# Patient Record
Sex: MALE | Race: BLACK OR AFRICAN AMERICAN | URBAN - METROPOLITAN AREA
[De-identification: names, ages, dates, MRNs, and addresses within clinical notes are randomized per-mention and may not be internally consistent; named-entity substitution may affect disease eponyms.]

---

## 2022-10-21 ENCOUNTER — OFFICE VISIT (OUTPATIENT)
Dept: URBAN - METROPOLITAN AREA CLINIC 56 | Facility: CLINIC | Age: 82
End: 2022-10-21
Payer: MEDICARE

## 2022-10-21 DIAGNOSIS — H11.062 RECURRENT PTERYGIUM OF LEFT EYE: ICD-10-CM

## 2022-10-21 DIAGNOSIS — H25.813 COMBINED FORMS OF AGE-RELATED CATARACT, BILATERAL: ICD-10-CM

## 2022-10-21 DIAGNOSIS — H40.1134 PRIMARY OPEN-ANGLE GLAUCOMA, INDETERMINATE, BILATERAL: ICD-10-CM

## 2022-10-21 DIAGNOSIS — H40.023 OPEN ANGLE WITH BORDERLINE FINDINGS, HIGH RISK, BILATERAL: Primary | ICD-10-CM

## 2022-10-21 PROCEDURE — 99204 OFFICE O/P NEW MOD 45 MIN: CPT | Performed by: OPTOMETRIST

## 2022-10-21 PROCEDURE — 92134 CPTRZ OPH DX IMG PST SGM RTA: CPT | Performed by: OPTOMETRIST

## 2022-10-21 PROCEDURE — 92133 CPTRZD OPH DX IMG PST SGM ON: CPT | Performed by: OPTOMETRIST

## 2022-10-21 ASSESSMENT — KERATOMETRY
OS: 41.72
OD: 42.48

## 2022-10-21 ASSESSMENT — VISUAL ACUITY
OS: 20/60
OD: 20/70

## 2022-10-21 ASSESSMENT — INTRAOCULAR PRESSURE
OS: 13
OD: 13

## 2022-10-21 NOTE — IMPRESSION/PLAN
Impression: Primary open-angle glaucoma, indeterminate, bilateral: H40.6671. Plan: Significant loss of RNFL/GCA OU. Discussed glaucoma with patient. Will get another IOP reading prior to starting treatment. Can do glaucoma procedure with cataract surgery. RTC for VF 24-2, PACHS and IOP check 3-4 weeks. Patient education importance of returning for appointments.

## 2022-10-21 NOTE — IMPRESSION/PLAN
Impression: Recurrent pterygium of left eye: H11.062. Plan: Not bothersome to patient. Recommend ATs and UV protection while outdoors.

## 2022-10-21 NOTE — IMPRESSION/PLAN
Impression: Combined forms of age-related cataract, bilateral: H25.813. Plan: Discussed cataract diagnosis with the patient. Discussed and reviewed treatment options for cataracts. Surgical treatment is required for cataracts. Risks and benefits of surgical treatment were discussed and understood. Recommend surgery OU, OD first. Patient is candidate/interested in multifocal, toric, standard, LenSx and ORA. Aim OD: plano. Aim OS: plano. Patient will need glasses for all near work, including computer. Outcome of surgery limitations include:  Recurrent pterygium of left eye, Open angle with borderline findings, high risk, bilateral, *Glaucoma surgeon recommended. *

## 2022-12-12 ENCOUNTER — OFFICE VISIT (OUTPATIENT)
Dept: URBAN - METROPOLITAN AREA CLINIC 56 | Facility: LOCATION | Age: 82
End: 2022-12-12
Payer: MEDICARE

## 2022-12-12 DIAGNOSIS — H40.1131 PRIMARY OPEN-ANGLE GLAUCOMA, MILD STAGE, BILATERAL: Primary | ICD-10-CM

## 2022-12-12 DIAGNOSIS — H25.813 COMBINED FORMS OF AGE-RELATED CATARACT, BILATERAL: ICD-10-CM

## 2022-12-12 PROCEDURE — 99214 OFFICE O/P EST MOD 30 MIN: CPT | Performed by: OPTOMETRIST

## 2022-12-12 PROCEDURE — 92083 EXTENDED VISUAL FIELD XM: CPT | Performed by: OPTOMETRIST

## 2022-12-12 PROCEDURE — 76514 ECHO EXAM OF EYE THICKNESS: CPT | Performed by: OPTOMETRIST

## 2022-12-12 RX ORDER — LATANOPROST 50 UG/ML
0.005 % SOLUTION OPHTHALMIC
Qty: 7.5 | Refills: 3 | Status: ACTIVE
Start: 2022-12-12

## 2022-12-12 ASSESSMENT — INTRAOCULAR PRESSURE
OD: 12
OS: 12

## 2022-12-12 NOTE — IMPRESSION/PLAN
Impression: Combined forms of age-related cataract, bilateral: H25.813. Plan: Patient wishes to hold off on cataract surgery for now. Discussed with patient that vision is limited and surgery will improve vision.

## 2022-12-12 NOTE — IMPRESSION/PLAN
Impression: Primary open-angle glaucoma, mild stage, bilateral: H40.1131. Plan: IOP 12/12 [address] OCT RNFL significant loss OU VF OD: full OS: superior arcuate defect. Discussed with patient due to findings on Visual Field, OCT, and posterior examination, treatment is recommended for glaucoma. IOP is too high for the level of glaucomatous damage at the present time. Recommend lowering IOP. Emphasized and explained compliance. Poor compliance can lead to blindness. Call with any changes in vision, sudden onset of pain or new symptoms. 
Start Latanoprost QHS OU

## 2023-01-09 ENCOUNTER — OFFICE VISIT (OUTPATIENT)
Dept: URBAN - METROPOLITAN AREA CLINIC 56 | Facility: LOCATION | Age: 83
End: 2023-01-09
Payer: MEDICARE

## 2023-01-09 DIAGNOSIS — H25.813 COMBINED FORMS OF AGE-RELATED CATARACT, BILATERAL: ICD-10-CM

## 2023-01-09 DIAGNOSIS — H40.1131 PRIMARY OPEN-ANGLE GLAUCOMA, MILD STAGE, BILATERAL: Primary | ICD-10-CM

## 2023-01-09 PROCEDURE — 99213 OFFICE O/P EST LOW 20 MIN: CPT | Performed by: OPTOMETRIST

## 2023-01-09 ASSESSMENT — INTRAOCULAR PRESSURE
OD: 10
OD: 12
OS: 10
OS: 11

## 2023-01-09 NOTE — IMPRESSION/PLAN
Impression: Primary open-angle glaucoma, mild stage, bilateral: H40.1131. Plan: IOP  improved OU. 
Continue Latanoprost QHS OU

## 2023-01-09 NOTE — IMPRESSION/PLAN
Impression: Combined forms of age-related cataract, bilateral: H25.813.
-patient's wife present today. patient is interested in cataract surgery. Fabi's wife will be traveliing to [place] March to May - will need to work around her schedule. Will need repeat cat eval. Plan: Discussed cataract diagnosis with the patient. Discussed and reviewed treatment options for cataracts. Risks and benefits of surgical treatment were discussed and understood. Recommend surgery OU, OD first. Patient is candidate/interested in multifocal, toric, standard, LenSx and ORA. Aim OD: plano. Aim OS: plano. Glaucoma surgeon recommended. Consider MIGS. Patient will need glasses for all near work, including computer.  Outcome of surgery limitations include:  Primary open-angle glaucoma, mild stage, bilateral,

## 2023-01-20 ENCOUNTER — OFFICE VISIT (OUTPATIENT)
Dept: URBAN - METROPOLITAN AREA CLINIC 56 | Facility: LOCATION | Age: 83
End: 2023-01-20
Payer: MEDICARE

## 2023-01-20 DIAGNOSIS — H25.813 COMBINED FORMS OF AGE-RELATED CATARACT, BILATERAL: Primary | ICD-10-CM

## 2023-01-20 DIAGNOSIS — H40.1131 PRIMARY OPEN-ANGLE GLAUCOMA, MILD STAGE, BILATERAL: ICD-10-CM

## 2023-01-20 DIAGNOSIS — H11.062 RECURRENT PTERYGIUM OF LEFT EYE: ICD-10-CM

## 2023-01-20 PROCEDURE — 99214 OFFICE O/P EST MOD 30 MIN: CPT | Performed by: OPTOMETRIST

## 2023-01-20 PROCEDURE — 92134 CPTRZ OPH DX IMG PST SGM RTA: CPT | Performed by: OPTOMETRIST

## 2023-01-20 ASSESSMENT — VISUAL ACUITY
OS: 20/70
OD: 20/80

## 2023-01-20 ASSESSMENT — INTRAOCULAR PRESSURE
OD: 11
OS: 10

## 2023-01-20 ASSESSMENT — KERATOMETRY
OD: 42.45
OS: 41.66

## 2023-01-20 NOTE — IMPRESSION/PLAN
Impression: Combined forms of age-related cataract, bilateral: H25.813.
-patient's wife present today. patient is interested in cataract surgery. Fabi's wife will be traveliing to [place] March to May - will need to work around her schedule. Will need repeat cat eval. Plan: Discussed cataract diagnosis with the patient. Discussed and reviewed treatment options for cataracts. Risks and benefits of surgical treatment were discussed and understood. Recommend surgery OU, OD first. Patient is candidate/interested in , standard. **Not a candidate for multifocal** Aim OD: plano. Aim OS: plano. *Glaucoma surgeon recommended. Consider MIGS. *
Patient will need glasses for all near work, including computer.  Outcome of surgery limitations include:  Primary open-angle glaucoma, mild stage, bilateral

## 2023-03-03 ENCOUNTER — TESTING ONLY (OUTPATIENT)
Dept: URBAN - METROPOLITAN AREA CLINIC 56 | Facility: LOCATION | Age: 83
End: 2023-03-03
Payer: MEDICARE

## 2023-03-03 DIAGNOSIS — H25.813 COMBINED FORMS OF AGE-RELATED CATARACT, BILATERAL: Primary | ICD-10-CM

## 2023-03-03 DIAGNOSIS — Z01.818 ENCOUNTER FOR OTHER PREPROCEDURAL EXAMINATION: Primary | ICD-10-CM

## 2023-03-03 PROCEDURE — 99203 OFFICE O/P NEW LOW 30 MIN: CPT | Performed by: PHYSICIAN ASSISTANT

## 2023-03-06 ENCOUNTER — PRE-OPERATIVE VISIT (OUTPATIENT)
Dept: URBAN - METROPOLITAN AREA CLINIC 44 | Facility: CLINIC | Age: 83
End: 2023-03-06
Payer: MEDICARE

## 2023-03-06 DIAGNOSIS — H40.1131 BILATERAL PRIMARY OPEN-ANGLE GLAUCOMA, MILD STAGE: Primary | ICD-10-CM

## 2023-03-06 DIAGNOSIS — H25.13 AGE-RELATED NUCLEAR CATARACT, BILATERAL: ICD-10-CM

## 2023-03-06 PROCEDURE — 99204 OFFICE O/P NEW MOD 45 MIN: CPT | Performed by: OPHTHALMOLOGY

## 2023-03-06 ASSESSMENT — INTRAOCULAR PRESSURE
OD: 12
OS: 12

## 2023-03-06 NOTE — IMPRESSION/PLAN
Impression: Bilateral primary open-angle glaucoma, mild stage: H40.1131. Plan: Pt has Glaucoma    Gonio :        Pachs:  474/467    Today's IOP :  12/12 Unknown Target IOP high singles low teens Pt denies Fhx of Glaucoma Left eye is the better seeing eye HVF OD Full OS superior nasal step 1/2023 C/D:  0.6x0.68/0.8x0.85 OCT:57/56 Pt denies Sulfa Allergy   // Pt denies Lung /Heart dx Plan :
1. Continue Latanoprost QHS OU Discussed details about Glaucoma and that without proper control of pressures irreversible blindness can occur. Patient understands risks. Emphasize compliance with drop and without compliance vision loss progression can occur. 
2. Recommend cataract with Multi Migs, very thin cornea IOP goal high singles very low teens

## 2023-03-06 NOTE — IMPRESSION/PLAN
Impression: Age-related nuclear cataract, bilateral: H25.13. Plan: Standard OD // Standard OS )( AIM-0.25 OU:  Trimoxi 1st choice, Block, MIGS (OMNI,HYDRUS))) - Min 15 **First case 25/2 medical transport Discussed cataract diagnosis with the patient. Appropriate testing ordered for cataract diagnosis prior to Preop. Risks and benefits of surgical treatment were discussed and understood. Patient desires surgical treatment. Discussed lens options with pt and pt is ok with wearing glasses after surgery. Patient desires to proceed with surgery. Both eyes examined, medically necessary due to impact in activities of daily living. Discussed with pt limitations of MIGS device and it does not replace  Glaucoma eye drops and would have to continue treatment and would still need glasses after surgery. Discussed higher risks with smaller pupil and discussed iris stretch and higher risks of bleeding.  Discussed there is a chance of developing capsular haze after surgery, which may be corrected with laser/yag

## 2023-03-20 ENCOUNTER — SURGERY (OUTPATIENT)
Dept: URBAN - METROPOLITAN AREA SURGERY 19 | Facility: SURGERY | Age: 83
End: 2023-03-20
Payer: MEDICARE

## 2023-03-20 DIAGNOSIS — H40.1131 PRIMARY OPEN-ANGLE GLAUCOMA, BILATERAL, MILD STAGE: ICD-10-CM

## 2023-03-20 DIAGNOSIS — H25.13 AGE-RELATED NUCLEAR CATARACT, BILATERAL: Primary | ICD-10-CM

## 2023-03-20 PROCEDURE — 66991 XCAPSL CTRC RMVL INSJ 1+: CPT | Performed by: OPHTHALMOLOGY

## 2023-03-21 ENCOUNTER — POST-OPERATIVE VISIT (OUTPATIENT)
Dept: URBAN - METROPOLITAN AREA CLINIC 56 | Facility: LOCATION | Age: 83
End: 2023-03-21

## 2023-03-21 DIAGNOSIS — Z48.810 ENCOUNTER FOR SURGICAL AFTERCARE FOLLOWING SURGERY ON A SENSE ORGAN: Primary | ICD-10-CM

## 2023-03-21 PROCEDURE — 99024 POSTOP FOLLOW-UP VISIT: CPT | Performed by: STUDENT IN AN ORGANIZED HEALTH CARE EDUCATION/TRAINING PROGRAM

## 2023-03-21 ASSESSMENT — INTRAOCULAR PRESSURE
OD: 10
OD: 12

## 2023-03-21 NOTE — IMPRESSION/PLAN
Impression: S/P Cataract Extraction by phacoemulsification with IOL placement; OMNI; Hydrus Microstent OD - 1 Day. Encounter for surgical aftercare following surgery on a sense organ  Z48.810. Excellent post op course   Post operative instructions reviewed - Plan: good IOP. Restrictions discussed. Call with worsening pain or vision. OK to stay of latanoprost OD, cont OS until sx RTC as scheduled for 1 WK PO

## 2023-03-27 DIAGNOSIS — Z48.810 ENCOUNTER FOR SURGICAL AFTERCARE FOLLOWING SURGERY ON A SENSE ORGAN: Primary | ICD-10-CM

## 2023-03-27 PROCEDURE — 99024 POSTOP FOLLOW-UP VISIT: CPT | Performed by: OPTOMETRIST

## 2023-03-27 RX ORDER — PREDNISOLONE ACETATE 10 MG/ML
1 % SUSPENSION/ DROPS OPHTHALMIC
Qty: 7.5 | Refills: 1 | Status: ACTIVE
Start: 2023-03-27

## 2023-03-27 RX ORDER — PREDNISOLONE ACETATE 10 MG/ML
1 % SUSPENSION/ DROPS OPHTHALMIC
Qty: 7.5 | Refills: 1 | Status: INACTIVE
Start: 2023-03-27 | End: 2023-03-27

## 2023-03-27 ASSESSMENT — INTRAOCULAR PRESSURE
OS: 10
OD: 13

## 2023-03-27 ASSESSMENT — VISUAL ACUITY
OD: 20/25
OS: 20/50

## 2023-03-27 NOTE — IMPRESSION/PLAN
Impression: S/P Cataract Extraction by phacoemulsification with IOL placement; OMNI; Hydrus Microstent OD - 7 Days. Encounter for surgical aftercare following surgery on a sense organ  Z48.810. Post operative instructions reviewed. Plan: Persistent iritis OD. Start Pred Forte QID OD, taper weekly. *Recommend using Pred Forte after OS also. Patient is now off Latanoprost OD. Ok to proceed with second eye.

## 2023-03-31 ENCOUNTER — POST-OPERATIVE VISIT (OUTPATIENT)
Dept: URBAN - METROPOLITAN AREA CLINIC 56 | Facility: LOCATION | Age: 83
End: 2023-03-31
Payer: MEDICARE

## 2023-03-31 PROCEDURE — 99024 POSTOP FOLLOW-UP VISIT: CPT | Performed by: OPTOMETRIST

## 2023-03-31 RX ORDER — LATANOPROST 50 UG/ML
0.005 % SOLUTION OPHTHALMIC
Qty: 7.5 | Refills: 3 | Status: ACTIVE
Start: 2023-03-31

## 2023-03-31 ASSESSMENT — INTRAOCULAR PRESSURE
OD: 12
OS: 12

## 2023-03-31 ASSESSMENT — VISUAL ACUITY
OS: 20/50
OD: 20/50

## 2023-03-31 NOTE — IMPRESSION/PLAN
Impression: S/P Cataract Extraction by phacoemulsification with IOL placement; OMNI; Hydrus Microstent OD - 11 Days. Encounter for surgical aftercare following surgery on a sense organ  Z48.810. Plan: S/p Cat SX OD Aim (-0.25) Inflammation improving. There is residual corneal folds. Continue tapering Pred Forte OD. Patient wishes to post pone 2nd eye surgery. 
RTC in 1 week for PO

## 2023-04-04 ENCOUNTER — POST-OPERATIVE VISIT (OUTPATIENT)
Dept: URBAN - METROPOLITAN AREA CLINIC 56 | Facility: LOCATION | Age: 83
End: 2023-04-04
Payer: MEDICARE

## 2023-04-04 DIAGNOSIS — Z48.810 ENCOUNTER FOR SURGICAL AFTERCARE FOLLOWING SURGERY ON A SENSE ORGAN: Primary | ICD-10-CM

## 2023-04-04 PROCEDURE — 99024 POSTOP FOLLOW-UP VISIT: CPT | Performed by: OPTOMETRIST

## 2023-04-04 ASSESSMENT — INTRAOCULAR PRESSURE
OS: 15
OD: 14

## 2023-04-04 ASSESSMENT — VISUAL ACUITY
OS: 20/40
OD: 20/30

## 2023-04-04 NOTE — IMPRESSION/PLAN
Impression: S/P Cataract Extraction by phacoemulsification with IOL placement; OMNI; Hydrus Microstent OD - 15 Days. Encounter for surgical aftercare following surgery on a sense organ  Z48.810. Plan: S/p Cat SX OS Aim (-0.25) Continue Pred Forte weekly taper - TID this week. Patient is off Latanoprost QHS OD, continue Latanoprost QHS OS. Patient now wants to schedule OS with Dr Mark Rubio in July - will have that set up. 
RTC as scheduled for 3 WK PO

## 2023-05-02 ENCOUNTER — POST-OPERATIVE VISIT (OUTPATIENT)
Dept: URBAN - METROPOLITAN AREA CLINIC 56 | Facility: LOCATION | Age: 83
End: 2023-05-02
Payer: MEDICARE

## 2023-05-02 DIAGNOSIS — Z48.810 ENCOUNTER FOR SURGICAL AFTERCARE FOLLOWING SURGERY ON A SENSE ORGAN: Primary | ICD-10-CM

## 2023-05-02 PROCEDURE — 99024 POSTOP FOLLOW-UP VISIT: CPT | Performed by: OPTOMETRIST

## 2023-05-02 ASSESSMENT — INTRAOCULAR PRESSURE
OS: 12
OD: 12

## 2023-05-02 ASSESSMENT — VISUAL ACUITY
OS: 20/60
OD: 20/30

## 2023-05-02 NOTE — IMPRESSION/PLAN
Impression: S/P Cataract Extraction by phacoemulsification with IOL placement; OMNI; Hydrus Microstent OD - 43 Days. Encounter for surgical aftercare following surgery on a sense organ  Z48.810. Plan: S/p Cat SX OD Aim (-0.25) OD healing well. Continue Latanoprost QHS OS Patient wishes to proceed with cataract surgery OS. Patient to return for Cat Eval in 1-2 weeks.

## 2023-05-19 ENCOUNTER — OFFICE VISIT (OUTPATIENT)
Dept: URBAN - METROPOLITAN AREA CLINIC 56 | Facility: LOCATION | Age: 83
End: 2023-05-19
Payer: MEDICARE

## 2023-05-19 DIAGNOSIS — H40.1131 PRIMARY OPEN-ANGLE GLAUCOMA, BILATERAL, MILD STAGE: Primary | ICD-10-CM

## 2023-05-19 DIAGNOSIS — Z96.1 PRESENCE OF INTRAOCULAR LENS: ICD-10-CM

## 2023-05-19 DIAGNOSIS — Z48.810 ENCOUNTER FOR SURGICAL AFTERCARE FOLLOWING SURGERY ON A SENSE ORGAN: ICD-10-CM

## 2023-05-19 DIAGNOSIS — H25.812 COMBINED FORMS OF AGE-RELATED CATARACT, LEFT EYE: ICD-10-CM

## 2023-05-19 PROCEDURE — 99214 OFFICE O/P EST MOD 30 MIN: CPT | Performed by: OPTOMETRIST

## 2023-05-19 PROCEDURE — 92133 CPTRZD OPH DX IMG PST SGM ON: CPT | Performed by: OPTOMETRIST

## 2023-05-19 ASSESSMENT — INTRAOCULAR PRESSURE
OS: 14
OD: 13

## 2023-05-19 ASSESSMENT — VISUAL ACUITY
OS: 20/50
OD: 20/25

## 2023-05-19 ASSESSMENT — KERATOMETRY
OS: 41.66
OD: 42.59

## 2023-05-19 NOTE — IMPRESSION/PLAN
Impression: S/P Cataract Extraction by phacoemulsification with IOL placement; OMNI; Hydrus Microstent OD - 43 Days. Encounter for surgical aftercare following surgery on a sense organ  Z48.270. Plan: S/p Cat SX OD Aim (-0.25) IOP stable, off Latanoprost OD Continue Latanoprost QHS OS

## 2023-05-19 NOTE — IMPRESSION/PLAN
Impression: Combined forms of age-related cataract, left eye: H25.812. Plan: Discussed cataract diagnosis with the patient. Discussed and reviewed treatment options for cataracts. Risks and benefits of surgical treatment were discussed and understood. Recommend surgery OS. Patient is candidate/interested in toric, standard, LenSx and ORA. Aim OS: plano. Glaucoma surgeon recommended. Consider MIGS. Patient will need glasses for all near work, including computer.  Outcome of surgery limitations include:  Primary open-angle glaucoma, mild stage, bilateral
RTC as scheduled for cataract surgery OS

## 2023-06-02 ENCOUNTER — ADULT PHYSICAL (OUTPATIENT)
Dept: URBAN - METROPOLITAN AREA CLINIC 56 | Facility: LOCATION | Age: 83
End: 2023-06-02
Payer: MEDICARE

## 2023-06-02 DIAGNOSIS — H25.812 COMBINED FORMS OF AGE-RELATED CATARACT, LEFT EYE: ICD-10-CM

## 2023-06-02 DIAGNOSIS — Z01.818 ENCOUNTER FOR OTHER PREPROCEDURAL EXAMINATION: Primary | ICD-10-CM

## 2023-06-02 PROCEDURE — 99213 OFFICE O/P EST LOW 20 MIN: CPT | Performed by: PHYSICIAN ASSISTANT

## 2023-06-19 ENCOUNTER — PRE-OPERATIVE VISIT (OUTPATIENT)
Dept: URBAN - METROPOLITAN AREA CLINIC 44 | Facility: CLINIC | Age: 83
End: 2023-06-19
Payer: MEDICARE

## 2023-06-19 DIAGNOSIS — H25.812 COMBINED FORMS OF AGE-RELATED CATARACT, LEFT EYE: Primary | ICD-10-CM

## 2023-06-19 DIAGNOSIS — H40.1131 BILATERAL PRIMARY OPEN-ANGLE GLAUCOMA, MILD STAGE: ICD-10-CM

## 2023-06-19 PROCEDURE — 99214 OFFICE O/P EST MOD 30 MIN: CPT | Performed by: OPHTHALMOLOGY

## 2023-06-19 RX ORDER — PREDNISOLONE ACETATE 10 MG/ML
1 % SUSPENSION/ DROPS OPHTHALMIC
Qty: 5 | Refills: 1 | Status: ACTIVE
Start: 2023-06-19

## 2023-06-19 ASSESSMENT — INTRAOCULAR PRESSURE
OS: 12
OD: 10

## 2023-06-19 NOTE — IMPRESSION/PLAN
Impression: Combined forms of age-related cataract, left eye: H25.812. 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 Appropriate testing ordered for cataract diagnosis prior to Preop. Risks and benefits of surgical treatment were discussed and understood. Patient desires surgical treatment. Discussed lens options with pt and pt is ok with wearing glasses after surgery. Patient desires to proceed with surgery. Both eyes examined, medically necessary due to impact in activities of daily living. Discussed with pt limitations of MIGS device and it does not replace  Glaucoma eye drops and would have to continue treatment and would still need glasses after surgery. Discussed higher risks with smaller pupil and discussed iris stretch and higher risks of bleeding.  Discussed there is a chance of developing capsular haze after surgery, which may be corrected with laser/yag

## 2023-06-19 NOTE — IMPRESSION/PLAN
Impression: Bilateral primary open-angle glaucoma, mild stage: H40.1131. Plan: Pt has Glaucoma    Gonio :        Pachs:  474/467    Today's IOP :  10/12 Target IOP high singles low teens Pt denies Fhx of Glaucoma Left eye is the better seeing eye HVF OD Full OS superior nasal step 1/2023 C/D:  0.6x0.68/0.8x0.85 OCT:57/56 Pt denies Sulfa Allergy   // Pt denies Lung /Heart dx Plan :
1. Continue Latanoprost QHS OU Discussed details about Glaucoma and that without proper control of pressures irreversible blindness can occur. Patient understands risks. Emphasize compliance with drop and without compliance vision loss progression can occur. 2. Recommend cataract with Multi Migs, very thin cornea IOP goal high singles very low teens.   Discussed with patient will limit the vision post cataract surgery

## 2023-06-27 ENCOUNTER — POST-OPERATIVE VISIT (OUTPATIENT)
Dept: URBAN - METROPOLITAN AREA CLINIC 56 | Facility: LOCATION | Age: 83
End: 2023-06-27
Payer: MEDICARE

## 2023-06-27 DIAGNOSIS — Z48.810 ENCOUNTER FOR SURGICAL AFTERCARE FOLLOWING SURGERY ON A SENSE ORGAN: Primary | ICD-10-CM

## 2023-06-27 PROCEDURE — 99024 POSTOP FOLLOW-UP VISIT: CPT | Performed by: STUDENT IN AN ORGANIZED HEALTH CARE EDUCATION/TRAINING PROGRAM

## 2023-06-27 ASSESSMENT — INTRAOCULAR PRESSURE: OS: 10

## 2023-06-27 NOTE — IMPRESSION/PLAN
Impression: S/P Cataract Extraction by phacoemulsification with IOL placement; Streamline Goniotomy; Hydrus OS - 1 Day. Encounter for surgical aftercare following surgery on a sense organ  Z48.810. Plan: good IOP. Restrictions discussed. Call with worsening pain or vision. 

OK to stop latanoprost QHS OS

RTC as scheduled for PO with Dr. Mary Grace Bronson

## 2023-07-18 ENCOUNTER — POST-OPERATIVE VISIT (OUTPATIENT)
Dept: URBAN - METROPOLITAN AREA CLINIC 56 | Facility: LOCATION | Age: 83
End: 2023-07-18
Payer: MEDICARE

## 2023-07-18 DIAGNOSIS — Z96.1 PRESENCE OF INTRAOCULAR LENS: ICD-10-CM

## 2023-07-18 DIAGNOSIS — H52.4 PRESBYOPIA: Primary | ICD-10-CM

## 2023-07-18 PROCEDURE — 99024 POSTOP FOLLOW-UP VISIT: CPT | Performed by: OPTOMETRIST

## 2023-07-18 ASSESSMENT — INTRAOCULAR PRESSURE
OD: 14
OS: 14
OS: 8
OD: 7

## 2023-07-18 ASSESSMENT — VISUAL ACUITY
OS: 20/40
OD: 20/30

## 2023-07-18 NOTE — IMPRESSION/PLAN
Impression: S/P Cataract Extraction by phacoemulsification with IOL placement; Streamline Goniotomy; Hydrus OS - 22 Days. Presence of intraocular lens  Z96.1. Plan: S/p Cataract surgery Aim (-.25) Finish Prednisolone as instructed. Patient off Latanoprost OU Updated glasses RX for best vision. 
RTC 4-6 for IOP check

## 2023-09-01 ENCOUNTER — OFFICE VISIT (OUTPATIENT)
Dept: URBAN - METROPOLITAN AREA CLINIC 56 | Facility: LOCATION | Age: 83
End: 2023-09-01
Payer: MEDICARE

## 2023-09-01 DIAGNOSIS — H40.1131 BILATERAL PRIMARY OPEN-ANGLE GLAUCOMA, MILD STAGE: Primary | ICD-10-CM

## 2023-09-01 PROCEDURE — 99213 OFFICE O/P EST LOW 20 MIN: CPT | Performed by: OPTOMETRIST

## 2023-09-01 ASSESSMENT — INTRAOCULAR PRESSURE
OS: 9
OD: 9

## 2024-01-08 ENCOUNTER — OFFICE VISIT (OUTPATIENT)
Dept: URBAN - METROPOLITAN AREA CLINIC 56 | Facility: LOCATION | Age: 84
End: 2024-01-08
Payer: MEDICARE

## 2024-01-08 DIAGNOSIS — H40.1131 PRIMARY OPEN-ANGLE GLAUCOMA, BILATERAL, MILD STAGE: Primary | ICD-10-CM

## 2024-01-08 PROCEDURE — 99213 OFFICE O/P EST LOW 20 MIN: CPT | Performed by: OPTOMETRIST

## 2024-01-08 PROCEDURE — 92083 EXTENDED VISUAL FIELD XM: CPT | Performed by: OPTOMETRIST

## 2024-01-08 ASSESSMENT — INTRAOCULAR PRESSURE
OS: 9
OD: 9

## 2024-05-24 ENCOUNTER — OFFICE VISIT (OUTPATIENT)
Dept: URBAN - METROPOLITAN AREA CLINIC 56 | Facility: LOCATION | Age: 84
End: 2024-05-24
Payer: MEDICARE

## 2024-05-24 DIAGNOSIS — Z96.1 PRESENCE OF INTRAOCULAR LENS: ICD-10-CM

## 2024-05-24 DIAGNOSIS — H26.491 OTHER SECONDARY CATARACT, RIGHT EYE: ICD-10-CM

## 2024-05-24 DIAGNOSIS — H40.1131 PRIMARY OPEN-ANGLE GLAUCOMA, BILATERAL, MILD STAGE: Primary | ICD-10-CM

## 2024-05-24 PROCEDURE — 92133 CPTRZD OPH DX IMG PST SGM ON: CPT | Performed by: OPTOMETRIST

## 2024-05-24 PROCEDURE — 92014 COMPRE OPH EXAM EST PT 1/>: CPT | Performed by: OPTOMETRIST

## 2024-05-24 ASSESSMENT — INTRAOCULAR PRESSURE
OD: 13
OS: 9
OD: 9
OS: 14

## 2024-05-24 ASSESSMENT — KERATOMETRY
OS: 41.67
OD: 42.40

## 2024-05-24 ASSESSMENT — VISUAL ACUITY
OD: 20/25
OS: 20/30

## 2025-02-03 ENCOUNTER — OFFICE VISIT (OUTPATIENT)
Dept: URBAN - METROPOLITAN AREA CLINIC 56 | Facility: LOCATION | Age: 85
End: 2025-02-03
Payer: MEDICARE

## 2025-02-03 DIAGNOSIS — H40.1131 PRIMARY OPEN-ANGLE GLAUCOMA, BILATERAL, MILD STAGE: Primary | ICD-10-CM

## 2025-02-03 PROCEDURE — 92083 EXTENDED VISUAL FIELD XM: CPT | Performed by: OPTOMETRIST

## 2025-02-03 PROCEDURE — 99213 OFFICE O/P EST LOW 20 MIN: CPT | Performed by: OPTOMETRIST

## 2025-02-03 ASSESSMENT — INTRAOCULAR PRESSURE
OD: 10
OS: 9

## 2025-08-20 ENCOUNTER — OFFICE VISIT (OUTPATIENT)
Dept: URBAN - METROPOLITAN AREA CLINIC 56 | Facility: LOCATION | Age: 85
End: 2025-08-20
Payer: MEDICARE

## 2025-08-20 DIAGNOSIS — Z96.1 PRESENCE OF INTRAOCULAR LENS: ICD-10-CM

## 2025-08-20 DIAGNOSIS — H40.1131 PRIMARY OPEN-ANGLE GLAUCOMA, BILATERAL, MILD STAGE: Primary | ICD-10-CM

## 2025-08-20 DIAGNOSIS — H04.123 DRY EYE SYNDROME OF BILATERAL LACRIMAL GLANDS: ICD-10-CM

## 2025-08-20 PROCEDURE — 92014 COMPRE OPH EXAM EST PT 1/>: CPT | Performed by: OPTOMETRIST

## 2025-08-20 PROCEDURE — 92133 CPTRZD OPH DX IMG PST SGM ON: CPT | Performed by: OPTOMETRIST

## 2025-08-20 ASSESSMENT — INTRAOCULAR PRESSURE
OS: 10
OD: 10

## 2025-08-20 ASSESSMENT — KERATOMETRY
OS: 41.70
OD: 42.53

## 2025-08-20 ASSESSMENT — VISUAL ACUITY
OS: 20/30
OD: 20/25